# Patient Record
Sex: FEMALE | Race: WHITE
[De-identification: names, ages, dates, MRNs, and addresses within clinical notes are randomized per-mention and may not be internally consistent; named-entity substitution may affect disease eponyms.]

---

## 2017-05-17 ENCOUNTER — HOSPITAL ENCOUNTER (OUTPATIENT)
Dept: HOSPITAL 57 - BURCT | Age: 67
Discharge: HOME | End: 2017-05-17
Attending: FAMILY MEDICINE
Payer: MEDICARE

## 2017-05-17 DIAGNOSIS — K57.90: ICD-10-CM

## 2017-05-17 DIAGNOSIS — R10.9: Primary | ICD-10-CM

## 2017-05-17 DIAGNOSIS — N13.2: ICD-10-CM

## 2017-05-17 PROCEDURE — 74176 CT ABD & PELVIS W/O CONTRAST: CPT

## 2017-05-17 NOTE — CT
CT ABDOMEN AND PELVIS WITHOUT CONTRAST

5/17/17

 

Spiral CT of the abdomen and pelvis was performed for evaluation of flank pain. Axial slices were ac
quired. Then coronal and sagittal reconstructions were done. 

 

The lung bases are clear. The liver, spleen, pancreas, adrenal glands and abdominal aorta showed no 
acute findings. 

 

Small bilateral nonobstructing renal calculi are present. In addition, however, there is a 7 mm calc
ulus in the proximal left ureter that is causing mild left hydronephrosis. No further stones were se
en downstream. 

 

The bowel is nondistended. There is no sign of obstruction, free fluid or free air. No inflammatory 
changes are seen around bowel. 

 

CT of the pelvis showed no pelvic masses or inflammatory changes. No free fluid is present. Some min
imal diverticula are present. Degenerative changes are present in the spine. 

 

IMPRESSION:  

1.      7 mm calculus in the proximal left ureter causing mild left hydronephrosis.

2.      Bilateral nonobstructing renal calculi. 

3.      Minimal diverticulosis without findings of diverticulitis. 

 

POS: HOME

## 2017-12-20 ENCOUNTER — HOSPITAL ENCOUNTER (OUTPATIENT)
Dept: HOSPITAL 92 - TBSIIMAG | Age: 67
Discharge: HOME | End: 2017-12-20
Attending: ORTHOPAEDIC SURGERY
Payer: MEDICARE

## 2017-12-20 DIAGNOSIS — S83.281A: ICD-10-CM

## 2017-12-20 DIAGNOSIS — M25.561: Primary | ICD-10-CM

## 2017-12-20 DIAGNOSIS — M17.11: ICD-10-CM

## 2017-12-20 DIAGNOSIS — S83.241A: ICD-10-CM

## 2017-12-20 NOTE — MRI
MRI OF THE RIGHT KNEE WITHOUT CONTRAST:

 

INDICATION: 

Right knee pain.

 

COMPARISON: 

None.

 

FINDINGS: 

There is mild osteoarthrosis involving the right knee with predominantly  affecting the patellofemora
l and medial femoral tibial joint compartments.  There is a complex but predominantly horizontally or
iented tear involving the body and posterior horn of the medial meniscus.  There is a horizontally or
iented tear involving the body of the posterior horn of the lateral meniscus.  There is suspected rad
ial tear involving the posterior root of the lateral meniscus on image 14 of series 5.  The ACL, PCL,
 MCL, and LCLC are intact.  There is subchondral cyst-like abnormality seen within the proximal fibul
a as well as within the patella.  The MCL, LCLC, and extensor mechanism are intact.

 

IMPRESSION: 

1.  Mild osteoarthrosis of the right knee.

2.  Medial and lateral meniscal tears.

 

POS: [facility]

## 2018-01-22 ENCOUNTER — HOSPITAL ENCOUNTER (OUTPATIENT)
Dept: HOSPITAL 92 - LABBT | Age: 68
Discharge: HOME | End: 2018-01-22
Attending: ORTHOPAEDIC SURGERY
Payer: MEDICARE

## 2018-01-22 DIAGNOSIS — Z01.812: Primary | ICD-10-CM

## 2018-01-22 DIAGNOSIS — M17.12: ICD-10-CM

## 2018-01-22 DIAGNOSIS — S83.206A: ICD-10-CM

## 2018-01-22 DIAGNOSIS — Z01.810: ICD-10-CM

## 2018-01-22 LAB
ANION GAP SERPL CALC-SCNC: 15 MMOL/L (ref 10–20)
BASOPHILS # BLD AUTO: 0 THOU/UL (ref 0–0.2)
BASOPHILS NFR BLD AUTO: 0.5 % (ref 0–1)
BUN SERPL-MCNC: 11 MG/DL (ref 9.8–20.1)
CALCIUM SERPL-MCNC: 9.2 MG/DL (ref 7.8–10.44)
CHLORIDE SERPL-SCNC: 103 MMOL/L (ref 98–107)
CO2 SERPL-SCNC: 28 MMOL/L (ref 23–31)
CREAT CL PREDICTED SERPL C-G-VRATE: 0 ML/MIN (ref 70–130)
EOSINOPHIL # BLD AUTO: 0.3 THOU/UL (ref 0–0.7)
EOSINOPHIL NFR BLD AUTO: 3.3 % (ref 0–10)
GLUCOSE SERPL-MCNC: 153 MG/DL (ref 80–115)
HGB BLD-MCNC: 14.2 G/DL (ref 12–16)
LYMPHOCYTES # BLD: 2.5 THOU/UL (ref 1.2–3.4)
LYMPHOCYTES NFR BLD AUTO: 32.2 % (ref 21–51)
MCH RBC QN AUTO: 30.5 PG (ref 27–31)
MCV RBC AUTO: 94.2 FL (ref 81–99)
MONOCYTES # BLD AUTO: 0.6 THOU/UL (ref 0.11–0.59)
MONOCYTES NFR BLD AUTO: 7.9 % (ref 0–10)
NEUTROPHILS # BLD AUTO: 4.3 THOU/UL (ref 1.4–6.5)
NEUTROPHILS NFR BLD AUTO: 56.1 % (ref 42–75)
PLATELET # BLD AUTO: 235 THOU/UL (ref 130–400)
POTASSIUM SERPL-SCNC: 4.6 MMOL/L (ref 3.5–5.1)
RBC # BLD AUTO: 4.66 MILL/UL (ref 4.2–5.4)
SODIUM SERPL-SCNC: 141 MMOL/L (ref 136–145)
WBC # BLD AUTO: 7.7 THOU/UL (ref 4.8–10.8)

## 2018-01-22 PROCEDURE — 93010 ELECTROCARDIOGRAM REPORT: CPT

## 2018-01-22 PROCEDURE — 85025 COMPLETE CBC W/AUTO DIFF WBC: CPT

## 2018-01-22 PROCEDURE — 80048 BASIC METABOLIC PNL TOTAL CA: CPT

## 2018-01-22 PROCEDURE — 93005 ELECTROCARDIOGRAM TRACING: CPT

## 2018-01-23 NOTE — EKG
Test Reason : 

Blood Pressure : ***/*** mmHG

Vent. Rate : 058 BPM     Atrial Rate : 058 BPM

   P-R Int : 152 ms          QRS Dur : 082 ms

    QT Int : 418 ms       P-R-T Axes : 016 032 039 degrees

   QTc Int : 410 ms

 

Sinus bradycardia

Cannot rule out Anterior infarct , age undetermined

Abnormal ECG

When compared with ECG of 24-MAY-2017 13:43,

No significant change was found

Confirmed by LUCIANA MUNOZ ([id number]) on 1/23/2018 11:37:10 PM

 

Referred By:  DORCAS           Confirmed By:LUCIANA MUNOZ

## 2018-01-24 ENCOUNTER — HOSPITAL ENCOUNTER (OUTPATIENT)
Dept: HOSPITAL 92 - SDC | Age: 68
Discharge: HOME | End: 2018-01-24
Attending: ORTHOPAEDIC SURGERY
Payer: MEDICARE

## 2018-01-24 VITALS — BODY MASS INDEX: 42.7 KG/M2

## 2018-01-24 DIAGNOSIS — M81.0: ICD-10-CM

## 2018-01-24 DIAGNOSIS — Z79.82: ICD-10-CM

## 2018-01-24 DIAGNOSIS — Z90.711: ICD-10-CM

## 2018-01-24 DIAGNOSIS — Z79.899: ICD-10-CM

## 2018-01-24 DIAGNOSIS — F17.210: ICD-10-CM

## 2018-01-24 DIAGNOSIS — S83.281A: ICD-10-CM

## 2018-01-24 DIAGNOSIS — G47.30: ICD-10-CM

## 2018-01-24 DIAGNOSIS — S83.231A: Primary | ICD-10-CM

## 2018-01-24 DIAGNOSIS — I10: ICD-10-CM

## 2018-01-24 DIAGNOSIS — Z98.890: ICD-10-CM

## 2018-01-24 DIAGNOSIS — F41.9: ICD-10-CM

## 2018-01-24 DIAGNOSIS — Z79.51: ICD-10-CM

## 2018-01-24 DIAGNOSIS — Z90.49: ICD-10-CM

## 2018-01-24 DIAGNOSIS — Z88.8: ICD-10-CM

## 2018-01-24 DIAGNOSIS — G43.909: ICD-10-CM

## 2018-01-24 DIAGNOSIS — Z80.9: ICD-10-CM

## 2018-01-24 DIAGNOSIS — Z98.1: ICD-10-CM

## 2018-01-24 DIAGNOSIS — M22.41: ICD-10-CM

## 2018-01-24 DIAGNOSIS — Z87.442: ICD-10-CM

## 2018-01-24 PROCEDURE — 0SBC4ZZ EXCISION OF RIGHT KNEE JOINT, PERCUTANEOUS ENDOSCOPIC APPROACH: ICD-10-PCS | Performed by: ORTHOPAEDIC SURGERY

## 2018-01-24 PROCEDURE — 97139 UNLISTED THERAPEUTIC PX: CPT

## 2018-01-24 PROCEDURE — S0020 INJECTION, BUPIVICAINE HYDRO: HCPCS

## 2018-01-24 PROCEDURE — 29880 ARTHRS KNE SRG MNISECTMY M&L: CPT

## 2018-01-24 NOTE — OP
DATE OF PROCEDURE:  01/24/2018

 

PREOPERATIVE DIAGNOSIS:  Right knee medial meniscus tear.

 

POSTOPERATIVE DIAGNOSES:

1.  Right knee medial and lateral meniscal tears.

2.  Global grade 2 chondromalacia, medial femoral condyle.

3.  Grade 2 chondromalacia of patella.



PROCEDURE:

1  Right knee arthroscopy with partial medial and lateral menisectomies

 

SURGEON:  Daron Benton M.D.

 

ASSISTANT:  None.

 

BLOOD LOSS:  Minimal.

 

COMPLICATIONS:  None.

 

ANESTHESIA:  The patient had general anesthetic.  She also had local knee block.

 

DISPOSITION:  She went to the recovery room in stable condition.

 

INDICATIONS:  This is a 67-year-old active female, who comes in complaining of 
sharp pain and swelling in the knee.  An MRI scan confirmed that Ms. Lyons 
had a significant posterior horn medial meniscus tear with a flap component, 
and at this time, she opted to have surgery.

 

DESCRIPTION OF PROCEDURE:  After all appropriate consent forms were explained 
and signed, she was taken to the operating room and at this time was given 
general anesthetic.  Once the level of anesthesia was appropriate, she was 
taken back to the operating room and at this time was given general anesthetic.
  Tourniquet was placed on the right thigh, and leg was placed in an 
arthroscopic leg duenas.  The limb was then prepped and draped in standard 
surgical fashion.  Limb was exsanguinated, and tourniquet taken up to 300 mmHg.
  An inferolateral portal was established, and the scope was placed into the 
knee joint.  Needle localization technique was then used to make a medial 
working portal.  Diagnostic arthroscopy commenced in the notch.  The ACL and 
PCL were probed and found to be intact.  There was a small bone spur at the ACL 
insertion.  This was taken down with the shaver.  The medial femoral condyle 
and medial tibial plateau were evaluated.  There was just a global grade 2, no 
exposed bone, no significant cartilage flaps.  There was a large complex tear 
of the posterior horn and body of the medial meniscus, and a partial 
meniscectomy was performed using meniscal biter and shaver.  Lateral 
compartment was entered.  There were a couple areas of the lateral meniscus, 
which were torn including the body and posterior horn/root.  Again, partial 
meniscectomy was performed back to a stable base.  Overall, in that compartment
, the cartilage was in good condition.  The gutters were swept through, and no 
loose bodies were noted in the medial gutter.  The lateral gutter had some 
cartilaginous loose bodies, which were removed with a suction shaver.  We then 
evaluated the patellofemoral joint.  The patella had some grade 2 
chondromalacia with some unstable chondral flaps, which were taken down gently 
with the shaver.  Overall, the trochlea did have some early chondromalacia with 
no significant treatments needed.  At this time, the scope was removed, the 
knee was drained.  Portals were closed with simple nylon stitch.  A bulky 
sterile dressing was applied, and the tourniquet was let down.  Toes pinked up 
nicely.  The patient was awakened and taken to recovery room in stable 
condition.  All counts were correct at the end of the case.  She received 
preoperative IV antibiotics.

 

CELESTINE

## 2018-04-23 ENCOUNTER — HOSPITAL ENCOUNTER (OUTPATIENT)
Dept: HOSPITAL 92 - TBSIIMAG | Age: 68
Discharge: HOME | End: 2018-04-23
Attending: ANESTHESIOLOGY
Payer: MEDICARE

## 2018-04-23 DIAGNOSIS — M48.061: ICD-10-CM

## 2018-04-23 DIAGNOSIS — M99.83: ICD-10-CM

## 2018-04-23 DIAGNOSIS — M47.26: Primary | ICD-10-CM

## 2018-04-23 PROCEDURE — 72148 MRI LUMBAR SPINE W/O DYE: CPT

## 2018-04-23 NOTE — MRI
MRI LUMBAR SPINE WITHOUT CONTRAST:

 

Date:  04/23/18 

 

HISTORY:  

M54.16, lumbar radiculopathy. 

 

COMPARISON:  

Lumbar spine radiograph from 2011. 

 

FINDINGS:

The aortic contour is normal. No aneurysmal dilatation. 

 

Small cyst of ovarian remnants. Paraspinal muscular atrophy, mild to moderate, and symmetric. 

 

No hydronephrosis. 

 

Posterior spinal fusion L4-S1. No listhesis. Adequate osseous corporation of the disc spacers. 

 

Background marrow signal is normal. 

 

Levels are as follows:

 

T12-L1:

Mild facet arthrosis. No neural foraminal or spinal canal narrowing. 

 

L1-2: 

Low grade disc bulge. No neural foraminal or spinal canal narrowing. 

 

L2-3:

Mild disc desiccation and height loss. Mild circumferential disc bulge. Mild facet arthropathy. The s
da canal measures approximately 6.0 mm. Mild bilateral neural foraminal narrowing. 

 

L3-4: 

Moderate degenerative disc space height loss. There is circumferential disc bulge. Moderate facet art
hropathy. There is ligamentum flavum hypertrophy. The spinal canal is narrowed approximately 5.0 mm. 
There is some crowding of the nerve roots at this level. There is moderate left and moderate right-si
ded neural foraminal narrowing with abutment of the exiting right nerve root. 

 

L4-5: 

No significant neural foraminal or spinal canal narrowing. 

 

L5-S1;

No neural foraminal or spinal canal narrowing. 

 

IMPRESSION: 

Multilevel spondylosis as described above, worst at L3-4 and L2-3, with neural foraminal and spinal c
anal narrowing. 

 

 

POS: [facility]

## 2018-06-22 ENCOUNTER — HOSPITAL ENCOUNTER (INPATIENT)
Dept: HOSPITAL 92 - ERS | Age: 68
LOS: 1 days | Discharge: HOME | DRG: 694 | End: 2018-06-23
Attending: FAMILY MEDICINE | Admitting: FAMILY MEDICINE
Payer: MEDICARE

## 2018-06-22 VITALS — BODY MASS INDEX: 44 KG/M2

## 2018-06-22 DIAGNOSIS — Z98.1: ICD-10-CM

## 2018-06-22 DIAGNOSIS — M81.0: ICD-10-CM

## 2018-06-22 DIAGNOSIS — F17.210: ICD-10-CM

## 2018-06-22 DIAGNOSIS — M19.90: ICD-10-CM

## 2018-06-22 DIAGNOSIS — Z87.440: ICD-10-CM

## 2018-06-22 DIAGNOSIS — Z87.19: ICD-10-CM

## 2018-06-22 DIAGNOSIS — I10: ICD-10-CM

## 2018-06-22 DIAGNOSIS — Z98.890: ICD-10-CM

## 2018-06-22 DIAGNOSIS — R31.9: ICD-10-CM

## 2018-06-22 DIAGNOSIS — E66.01: ICD-10-CM

## 2018-06-22 DIAGNOSIS — F41.9: ICD-10-CM

## 2018-06-22 DIAGNOSIS — G47.33: ICD-10-CM

## 2018-06-22 DIAGNOSIS — N13.2: Primary | ICD-10-CM

## 2018-06-22 LAB
ALBUMIN SERPL BCG-MCNC: 4.1 G/DL (ref 3.4–4.8)
ALP SERPL-CCNC: 187 U/L (ref 40–150)
ALT SERPL W P-5'-P-CCNC: 21 U/L (ref 8–55)
ANION GAP SERPL CALC-SCNC: 15 MMOL/L (ref 10–20)
APTT PPP: 31.7 SEC (ref 22.9–36.1)
AST SERPL-CCNC: 18 U/L (ref 5–34)
BASOPHILS # BLD AUTO: 0 THOU/UL (ref 0–0.2)
BASOPHILS NFR BLD AUTO: 0.4 % (ref 0–1)
BILIRUB SERPL-MCNC: 0.3 MG/DL (ref 0.2–1.2)
BUN SERPL-MCNC: 14 MG/DL (ref 9.8–20.1)
CALCIUM SERPL-MCNC: 9.3 MG/DL (ref 7.8–10.44)
CHLORIDE SERPL-SCNC: 105 MMOL/L (ref 98–107)
CO2 SERPL-SCNC: 26 MMOL/L (ref 23–31)
CREAT CL PREDICTED SERPL C-G-VRATE: 0 ML/MIN (ref 70–130)
CRYSTAL-AUWI FLAG: 0.8 (ref 0–15)
CRYSTAL-AUWI FLAG: 1.1 (ref 0–15)
EOSINOPHIL # BLD AUTO: 0.2 THOU/UL (ref 0–0.7)
EOSINOPHIL NFR BLD AUTO: 2 % (ref 0–10)
GLOBULIN SER CALC-MCNC: 2.7 G/DL (ref 2.4–3.5)
GLUCOSE SERPL-MCNC: 164 MG/DL (ref 80–115)
HEV IGM SER QL: 0.3 (ref 0–7.99)
HEV IGM SER QL: 5.3 (ref 0–7.99)
HGB BLD-MCNC: 14.1 G/DL (ref 12–16)
HYALINE CASTS #/AREA URNS LPF: (no result) LPF
INR PPP: 1
LYMPHOCYTES # BLD: 2.1 THOU/UL (ref 1.2–3.4)
LYMPHOCYTES NFR BLD AUTO: 22.5 % (ref 21–51)
MCH RBC QN AUTO: 30.5 PG (ref 27–31)
MCV RBC AUTO: 91.2 FL (ref 78–98)
MONOCYTES # BLD AUTO: 0.5 THOU/UL (ref 0.11–0.59)
MONOCYTES NFR BLD AUTO: 5.8 % (ref 0–10)
NEUTROPHILS # BLD AUTO: 6.5 THOU/UL (ref 1.4–6.5)
NEUTROPHILS NFR BLD AUTO: 69.4 % (ref 42–75)
PATHC CAST-AUWI FLAG: 1.71 (ref 0–2.49)
PATHC CAST-AUWI FLAG: 9.01 (ref 0–2.49)
PLATELET # BLD AUTO: 258 THOU/UL (ref 130–400)
POTASSIUM SERPL-SCNC: 4.1 MMOL/L (ref 3.5–5.1)
PROT UR STRIP.AUTO-MCNC: 100 MG/DL
PROT UR STRIP.AUTO-MCNC: 30 MG/DL
PROTHROMBIN TIME: 13.3 SEC (ref 12–14.7)
RBC # BLD AUTO: 4.62 MILL/UL (ref 4.2–5.4)
RBC UR QL AUTO: (no result) HPF (ref 0–3)
SODIUM SERPL-SCNC: 142 MMOL/L (ref 136–145)
SP GR UR STRIP: 1.03 (ref 1–1.04)
SP GR UR STRIP: 1.03 (ref 1–1.04)
SPERM-AUWI FLAG: 0 (ref 0–9.9)
SPERM-AUWI FLAG: 0 (ref 0–9.9)
WBC # BLD AUTO: 9.3 THOU/UL (ref 4.8–10.8)
WBC UR QL AUTO: (no result) HPF (ref 0–3)
WBC UR QL AUTO: (no result) HPF (ref 0–3)
YEAST-AUWI FLAG: 0 (ref 0–25)
YEAST-AUWI FLAG: 176.6 (ref 0–25)

## 2018-06-22 PROCEDURE — 71045 X-RAY EXAM CHEST 1 VIEW: CPT

## 2018-06-22 PROCEDURE — 90670 PCV13 VACCINE IM: CPT

## 2018-06-22 PROCEDURE — A4353 INTERMITTENT URINARY CATH: HCPCS

## 2018-06-22 PROCEDURE — G0009 ADMIN PNEUMOCOCCAL VACCINE: HCPCS

## 2018-06-22 PROCEDURE — 74420 UROGRAPHY RTRGR +-KUB: CPT

## 2018-06-22 PROCEDURE — 96365 THER/PROPH/DIAG IV INF INIT: CPT

## 2018-06-22 PROCEDURE — 87086 URINE CULTURE/COLONY COUNT: CPT

## 2018-06-22 PROCEDURE — 85610 PROTHROMBIN TIME: CPT

## 2018-06-22 PROCEDURE — 81015 MICROSCOPIC EXAM OF URINE: CPT

## 2018-06-22 PROCEDURE — 96375 TX/PRO/DX INJ NEW DRUG ADDON: CPT

## 2018-06-22 PROCEDURE — 81003 URINALYSIS AUTO W/O SCOPE: CPT

## 2018-06-22 PROCEDURE — C1758 CATHETER, URETERAL: HCPCS

## 2018-06-22 PROCEDURE — 90471 IMMUNIZATION ADMIN: CPT

## 2018-06-22 PROCEDURE — C1769 GUIDE WIRE: HCPCS

## 2018-06-22 PROCEDURE — 80048 BASIC METABOLIC PNL TOTAL CA: CPT

## 2018-06-22 PROCEDURE — 85730 THROMBOPLASTIN TIME PARTIAL: CPT

## 2018-06-22 PROCEDURE — 85025 COMPLETE CBC W/AUTO DIFF WBC: CPT

## 2018-06-22 PROCEDURE — 36415 COLL VENOUS BLD VENIPUNCTURE: CPT

## 2018-06-22 PROCEDURE — 96361 HYDRATE IV INFUSION ADD-ON: CPT

## 2018-06-22 PROCEDURE — 80053 COMPREHEN METABOLIC PANEL: CPT

## 2018-06-22 PROCEDURE — 74176 CT ABD & PELVIS W/O CONTRAST: CPT

## 2018-06-22 PROCEDURE — 93005 ELECTROCARDIOGRAM TRACING: CPT

## 2018-06-22 PROCEDURE — 96376 TX/PRO/DX INJ SAME DRUG ADON: CPT

## 2018-06-22 NOTE — CT
CT ABDOMEN NONCONTRAST

CT PELVIS NONCONTRAST:

(urolithiasis protocol)

 

DATE:

6/22/18

 

TIME:

5:45 p.m.

 

HISTORY:

68-year-old female with history of calculus of kidney presents with acute right flank pain. 

 

COMPARISON:

5/17/17

 

TECHNIQUE:

IV injection of iodinated contrast media: none

Oral contrast media: none

 

FINDINGS:

Other than for urolithiasis, the lack of IV and oral contrast limits the evaluation.

 

Previously, there was a calculus lodged in the left mid ureter causing left hydronephrosis. That calc
ulus is no longer present in the left ureter, and there is currently no left sided hydronephrosis. Ho
wever, one of the right renal lower pole renal calculi on the previous study has now migrated to the 
right proximal ureter at the upper L4 level. It measures 6 x 6 x 3 mm. It causes a new finding of mod
erate dilation of the extrarenal pelvis, mild to moderate dilation of right calyces, a small amount o
f right perirenal fluid, and right nephromegaly. The perirenal fluid may represent extravasated urine
 due to pyelocalyceal blowout. There are at least two other calculi in the right kidney, approximatel
y 2 mm in size each. There are a few calculi in the left renal upper and lower poles, on the order of
 2 mm in size each. The urinary bladder is empty.

 

Within limitations of a noncontrast scan, no major obvious pathology is identified involving the abdo
brennan aorta, adrenals, or spleen.  There is mild hepatic steatosis and mild atrophy of the pancreas. 
Numerous diverticula through the sigmoid colon and descending colon, without signs of acute diverticu
litis. No ascites or pneumoperitoneum. No small bowel dilation. Bilateral pedicle screws and vertical
 interlocking rods and interbody cages, at lower lumbar spine. 

 

IMPRESSION:

1.      A 6 mm calculus lodged at the right proximal ureter causing obstructive uropathy, with mild t
o moderate right hydronephrosis and right perirenal fluid. 

2.      Bilateral nephrolithiasis.

3.      Hepatic steatosis.

4.      Colonic diverticulosis without diverticulitis. 

 

 

OLIVER CHEW

 

POS: [facility]

## 2018-06-22 NOTE — RAD
RADIOGRAPH CHEST 1 VIEW:

6/22/18

 

HISTORY: 

68-year-old female for preoperative evaluation.

 

FINDINGS:

There are no air space densities, pulmonary edema, pneumothorax, or cardiomegaly.  The lateral costop
hrenic angles are sharp.

 

IMPRESSION:  

No acute cardiopulmonary findings.

 

 

marky []

 

POS: [facility]

## 2018-06-23 VITALS — SYSTOLIC BLOOD PRESSURE: 143 MMHG | TEMPERATURE: 97.5 F | DIASTOLIC BLOOD PRESSURE: 81 MMHG

## 2018-06-23 LAB
ANION GAP SERPL CALC-SCNC: 13 MMOL/L (ref 10–20)
BASOPHILS # BLD AUTO: 0.1 THOU/UL (ref 0–0.2)
BASOPHILS NFR BLD AUTO: 0.6 % (ref 0–1)
BUN SERPL-MCNC: 18 MG/DL (ref 9.8–20.1)
CALCIUM SERPL-MCNC: 9 MG/DL (ref 7.8–10.44)
CHLORIDE SERPL-SCNC: 105 MMOL/L (ref 98–107)
CO2 SERPL-SCNC: 26 MMOL/L (ref 23–31)
CREAT CL PREDICTED SERPL C-G-VRATE: 108 ML/MIN (ref 70–130)
EOSINOPHIL # BLD AUTO: 0.2 THOU/UL (ref 0–0.7)
EOSINOPHIL NFR BLD AUTO: 2.1 % (ref 0–10)
GLUCOSE SERPL-MCNC: 133 MG/DL (ref 80–115)
HGB BLD-MCNC: 12.3 G/DL (ref 12–16)
LYMPHOCYTES # BLD: 2.6 THOU/UL (ref 1.2–3.4)
LYMPHOCYTES NFR BLD AUTO: 30.1 % (ref 21–51)
MANUAL MICROSCOPIC REVIEWED?: (no result)
MCH RBC QN AUTO: 30.4 PG (ref 27–31)
MCV RBC AUTO: 91.6 FL (ref 78–98)
MONOCYTES # BLD AUTO: 0.7 THOU/UL (ref 0.11–0.59)
MONOCYTES NFR BLD AUTO: 8 % (ref 0–10)
NEUTROPHILS # BLD AUTO: 5.1 THOU/UL (ref 1.4–6.5)
NEUTROPHILS NFR BLD AUTO: 59.2 % (ref 42–75)
PLATELET # BLD AUTO: 209 THOU/UL (ref 130–400)
POTASSIUM SERPL-SCNC: 4.2 MMOL/L (ref 3.5–5.1)
RBC # BLD AUTO: 4.03 MILL/UL (ref 4.2–5.4)
RBC UR QL AUTO: (no result) HPF (ref 0–3)
SODIUM SERPL-SCNC: 140 MMOL/L (ref 136–145)
WBC # BLD AUTO: 8.6 THOU/UL (ref 4.8–10.8)

## 2018-06-23 PROCEDURE — BT1D1ZZ FLUOROSCOPY OF RIGHT KIDNEY, URETER AND BLADDER USING LOW OSMOLAR CONTRAST: ICD-10-PCS | Performed by: UROLOGY

## 2018-06-23 PROCEDURE — 0T768DZ DILATION OF RIGHT URETER WITH INTRALUMINAL DEVICE, VIA NATURAL OR ARTIFICIAL OPENING ENDOSCOPIC: ICD-10-PCS | Performed by: UROLOGY

## 2018-06-23 NOTE — RAD
RETROGRADE IVP:

 

COMPARISON: 

CT abdomen/pelvis 6/22/18.

 

FINDINGS/IMPRESSION: 

Multiple limited intraoperative fluoroscopic views from a retrograde IVP were submitted for interpret
ation.  Hardware is seen in the lumbar spine.  No obvious calcifications are seen projecting over eit
her renal shadow. The calcifications seen in the proximal ureter on CT is difficult to visualize on t
he fluoroscopic images.  A double-J ureteral stent is eventually placed in the right renal collecting
 system.

 

POS: [facility]

## 2018-06-23 NOTE — HP
PRIMARY CARE PHYSICIAN:  Dr. Hollis

 

CODE STATUS:  FULL CODE.

 

TIME OF EVALUATION:  2100 hours.

 

CHIEF COMPLAINT:  Right flank pain.

 

HISTORY OF PRESENT ILLNESS:  This is a 68-year-old female patient with past 
medical history of multiple kidney stones in the past, also has a history of 
hypertension.  The patient came to the hospital after having severe right flank 
pain that has been on since 2:00 p.m. from the very beginning, the pain was 10/
10, associated with sweating, nausea, no clear triggers, no alleviating 
factors.  CT scan was done.  The patient shows a 6-mm calculus lodged at the 
right proximal ureter causing obstructive uropathy with mild-to-moderate right 
hydronephrosis and right perirenal fluid.  For that reason, Urology was being 
consulted and is planning for the patient to have cystoscopy, stent placement 
tomorrow morning.

 

REVIEW OF SYSTEMS:  Constitutional:  No fever, no chills, generalized weakness.
  Respiratory:  No cough, no sputum production, no shortness of breath.  
Cardiovascular:  No chest pain, palpitation, shortness of breath.  
Gastrointestinal:  No abdominal pain, no nausea, no vomiting, no diarrhea.  
Central Nervous System:  No dizziness, headache, or feeling lightheaded.  
Genitourinary:  No burning on urination.  Extremities:  No leg swelling.  All 
other systems reviewed were negative except for the findings mentioned above.

 

PAST MEDICAL HISTORY:  Positive for hypertension and kidney stones.

 

PAST SURGICAL HISTORY:  Back surgery x4, cholecystectomy, hysterectomy.

 

PSYCHIATRIC HISTORY:  Includes depression.

 

SOCIAL HISTORY:  The patient uses tobacco.  Denies drug use.  Denies alcohol 
use.

 

ALLERGIES:  Latex allergies and metformin.

 

REPORTED MEDICATIONS:  Carvedilol 6.25 mg orally b.i.d., ramipril 5 mg orally 
daily, atorvastatin 40 mg orally daily, duloxetine 60 mg orally daily, aspirin 
81 mg orally daily, Vicodin 300 mg every 8 hours daily, gabapentin 300 mg 
orally.

 

PHYSICAL EXAMINATION:

VITAL SIGNS:  On presentation, blood pressure 182/82 systolic with a pulse 71, 
respiratory rate 18, temperature 97.9, pain 10/10 on presentation, saturation 
93 on room air.

GENERAL APPEARANCE:  She is alert, oriented, not in distress and pain 
medication hasalleviated the pain.

HEENT:  Eyes:  Normal conjunctivae.  Moist oral mucosa.  Anicteric.

NECK:  No JVD.

RESPIRATORY:  Bilateral air entry.  No rales, no wheezing.  Symmetrical 
expansion.

CARDIOVASCULAR:  Normal rate, regular rhythm.  No murmurs, no gallop, no edema.

ABDOMEN:  Soft.  Patient has tenderness in the right side alleviated by pain 
medication.

MUSCULOSKELETAL:  Baseline range of motion and strength.  No tenderness.

SKIN:  Warm and intact.  No pallor, no rash, no redness.

NEUROLOGIC:  Baseline sensorium.  No evidence of any new focal weakness.  
Baseline speech.  Cranial nerves seemed to be intact.

PSYCHIATRIC:  Good mood.  No anxiety.  Oriented, optimal judgment.

 

LABORATORY DATA:  Reviewed.  The patient had white count 9.3, hemoglobin 14.1, 
MCV 91, platelet count 158,000.  PT 13, INR 1, PTT 31.  Chemistry:  Sodium 142, 
potassium 4.1, carbon dioxide 26, anion gap 15, BUN 14, creatinine 0.8, GFR 63, 
glucose 164, calcium 9.3.  LFTs are normal.  The urine was reviewed, the 
patient has white count that was normal 2 to 3.  The patient has hematuria with 
rbc's greater than 50, too numerous to count.

 

IMAGING:

CAT scan;

1.  A 6-mm calculus lodged in the right proximal ureter causing obstructive 
uropathy with mild-to-moderate right hydronephrosis and right perirenal fluid.

2.  Bilateral nephrolithiasis.

3.  Hepatic steatosis.

4.  Colonic diverticulosis without diverticulitis. 

 

Chest x-ray reported no acute cardiopulmonary problems.

 

ASSESSMENT AND PLAN:  The patient was admitted with the following medical 
condition.

1.  Right nephrolithiasis with urinary obstruction, as seen on the CT of the 
abdomen, urology has seen the patient, planned to place a stent in the morning.
  _ Continue all previous medication, pain meds,continue IV fluids.

2.  Hematuria.  Likely related to kidney stone.  We will treat the underlying 
condition.

3.  Uncontrolled blood pressure with systolic 187, likely secondary to acute 
physical distress, we will treat the underlying condition, we will reconcile 
home medications and treat p.r.n. for optimal control.

4.  Deep venous thrombosis prophylaxis.

 

DANKD

## 2018-06-23 NOTE — OP
DATE OF SERVICE:  06/23/2018

 

PREOPERATIVE DIAGNOSES:

1.  A 68-year-old female with history of recurrent kidney stone, right 
hydronephrosis secondary to proximal ureteral calculi.

2.  Intractable pain.

 

POSTOPERATIVE DIAGNOSES:

1.  A 68-year-old female with history of recurrent kidney stone, right 
hydronephrosis secondary to proximal ureteral calculi.

2.  Intractable pain.

 

PROCEDURE:  Cystoscopy, right retrograde 6 x 24 stent placement.

 

SURGEON:  Olive Borges D.O.

 

ANESTHESIA:  General.

 

COMPLICATIONS:  None apparent.

 

DISPOSITION:  Recovery room in stable condition.

 

INDICATIONS FOR THE PROCEDURE AND HISTORY:  Ms. Lyons is a 68-year-old female 
with history of recurrent kidney stone followed by Dr. Friedman.  The patient 
presented to the emergency room late yesterday evening due to intractable right 
flank pain.  She presents for cystoscopy, right retrograde stent placement.  
She has been fully informed regarding indications for ureteral stent, that 
elective ureteroscopy, laser lithotripsy will be scheduled at a later date at 
the discretion of Dr. Friedman.  Urine culture is pending.  Risks, complications 
and indications reviewed with her in detail.  The patient has been fully 
informed regarding expectation of discomfort that can be present with 
indwelling ureteral stent.  She desires to proceed.

 

DESCRIPTION OF THE PROCEDURE:  After an informed consent is signed, the patient 
is taken to the operating room, placed in a dorsal lithotomy position with the 
genital area prepped and draped in usual surgical sterile fashion.  A 21-Yakut 
cystoscope was utilized for cystoscopy.  Upon entering the bladder, mild debris 
was noted.  The UO's are located in normal anatomical location.  There was no 
evidence of bladder tumor.  No bladder stones.  The right UO was intubated with 
a 5 Yakut open-ended catheter and using 1:1 diluted contrast, retrograde 
pyelogram performed.  There was a subtle filling defect in the right proximal 
ureter consistent with stone and hydronephrosis.  There was no extravasation of 
contrast.  A 0.35 sensor wire was then placed into the right upper pole.  A 6 x 
24 double-J ureteral stent passed without difficulty.  The wire was then 
completely removed and bladder emptied.  She will be transferred back to the 
floor.  Patient can be discharged home when tolerated with oral pain 
medication.  The patient is instructed to call our office on Monday to schedule 
a followup appointment with Dr. Friedman.  Prescription is in chart for Norco 10/
325, ciprofloxacin for 3 days until culture can be reviewed, AZO over the 
counter p.r.n., VESIcare today for bladder spasm.

 

MTDD

## 2018-06-23 NOTE — CON
DATE OF CONSULTATION:  06/22/2018

 

REASON FOR CONSULTATION:  Intractable pain due to right proximal ureteral 
calculi.

 

PRIMARY UROLOGIST:  Bean Friedman M.D.

 

HISTORY OF PRESENT ILLNESS:  Ms. Lyons is a 68-year-old morbidly obese female 
with history of recurrent kidney stone, followed by Dr. Friedman.  The patient 
called our office late this afternoon, she is on chronic narcotics due to back 
pain on Percocet.  She states that her pain and her right lower back started at 
2:00 while shopping due to intractable discomfort which she rated 30/10 on the 
pain scale.  She presented to the emergency room.  She has been provided 
appropriate pain medication and currently rates her pain 0/10.  She denies 
history of nausea, vomiting, fever or gross hematuria.  CT of the abdomen and 
pelvis obtained demonstrates right hydronephrosis, with perinephritic 
stranding.  Per ER staff, the patient has had no fever, no leukocytosis.

 

PAST MEDICAL HISTORY:  Includes hypertension, migraines, hemangioma, arthritis, 
anxiety, anemia, diverticulosis, obstructive sleep apnea, hypercholesterolemia, 
recurrent kidney stone, osteoporosis.

 

PAST SURGICAL HISTORY:  Lumbar fusion, hysterectomy, tonsillectomy, sinus 
surgery, laparoscopic cholecystectomy, status left ureteroscopy, laser 
lithotripsy, 6 x 26 double-J ureteral stent placement 05/2017 by Dr. Friedman, 
right knee meniscectomy 01/2018.

 

FAMILY HISTORY:  Positive for heart disease, asthma.

 

SOCIAL HISTORY:  She is a smoker, half pack a day for 30 years.  Denies 
excessive caffeine intake.  She is retired, has 2 children, .

 

ALLERGIES:  No known drug allergies

 

PHYSICAL EXAMINATION:  Per ER staff,

VITAL SIGNS:  Stable.

GENERAL:  The patient appears to be resting comfortably, denies pain at this 
time.

HEENT:  Grossly unremarkable.

HEART:  Regular rate.

LUNGS:  Clear.

ABDOMEN:  Morbidly obese, protuberant.  There is a midline laparotomy incision.
  There is no gross CVA tenderness on physical exam.

GENITOURINARY:  Deferred at this time.

EXTREMITIES:  No cyanosis, clubbing or edema.

 

PERTINENT LABS AND IMAGING:  Prior CT 05/2017 demonstrated 7 mm proximal left 
ureteral calculi with bilateral nonobstructing renal calculus, prior stone 
analysis demonstrating calcium moiety 95% monohydrate.

 

Current presenting white count is 9, hemoglobin 14, platelet 258, creatinine 
0.89.  Urinalysis demonstrates 4-6 rbc's, greater than 50 wbc's, 100 protein, 7-
10 epithelial, no bacteria, negative nitrites.

 

CT of the abdomen and pelvis stone protocol dated 06/22/2018 which I reviewed 
myself demonstrates no evidence of left hydronephrosis.  There are multiple 
left punctate renal calculi, approximately 3-4 in number, largest in the left 
upper pole approximately 3 mm.  Right kidney does demonstrate hydronephrosis, 
moderate in nature secondary to 6 x 6 x 3 mm stone at the level of 4.  Per 
official report, there is a mild amount of perirenal fluid.  This is minimal.  
There is a nephromegaly present.  Hepatic steatosis.

 

IMPRESSION/PLAN:  Ms. Lyons is a 68-year-old morbidly obese female with 
history of recurrent kidney stone, presents with right intractable flank pain 
due to proximal ureteral calculi.  Currently, her pain is controlled with IV 
pain medication.  The patient likely also has narcotic tolerance that she is on 
chronic Percocet.  The patient currently is stable.  She is scheduled for cysto 
right retrograde stent placement tomorrow morning.   n.p.o. after midnight.  
Informed patient that if she is feeling better with ureteral stent tomorrow, 
will be discharged and will follow up with Dr. Friedman for elective ureteroscopy
, laser lithotripsy.  As her initial UA is contaminated, we will order straight 
cath, UA, C and S.  N.p.o. after midnight.

 

[facility] InsaneRAVINDER

## 2018-06-24 NOTE — DIS
DATE OF ADMISSION:  06/22/2018

 

DATE OF DISCHARGE:  06/23/2018

 

CONDITION AT THE TIME OF DISCHARGE:  Stable and improved.

 

DISCHARGE DIAGNOSES:

1.  Right-sided hydronephrosis and nephrolithiasis, status post cystoscopy and right ureteral stent p
lacement.

2.  Renal colic.

3.  Hypertension.

4.  History of recurrent renal stones.

 

DISCHARGE MEDICATIONS:  Resume home medications as per the H&P dictated by my colleague, Dr. Garcia
.  New medications as follows:  Ciprofloxacin 500 mg p.o. q.12 hours for 3 days, VESIcare 5 mg daily,
 Norco as needed, and phenazopyridine 97.5 mg t.i.d. p.r.n.

 

PRIMARY CARE PHYSICIAN:  Emre Ledesma M.D.

 

INHOUSE CONSULTATIONS:  Neurology, Dr. Olive Borges.

 

PROCEDURES DONE IN THE HOSPITAL:  Include retrograde pyelogram and cystoscopy with right ureteral rhea
nt placement.  CT scan of the abdomen and pelvis upon presentation, which shows a 6 mm calculus lodge
d at the right proximal ureter causing obstructive uropathy with mild to moderate right-sided hydrone
phrosis and bilateral nephrolithiasis and hepatic steatosis and colonic diverticulosis without divert
iculitis.

 

HISTORY OF PRESENTING ILLNESS:  Ms. Lyons is a very pleasant 68-year-old female with known history 
of multiple recurrent stones, who presented to the emergency room with complaints of severe right-arslan
ed renal colic and flank pain.  Upon presentation, her urinalysis was unrevealing.  She did have hema
turia with rbc's greater than 50 and CT scan was done in the emergency room which showed 6 mm calculu
s lodged in the right side with right-sided hydronephrosis.  Urology was consulted and Internal Medic
ine team was consulted for admission.  Please see admission history and physical for further details.


 

The patient was seen by Urology, Dr. Olive Borges, earlier today morning and was taken to the OR
.  She underwent a successful right-sided ureteric stent placement.  She underwent cystoscopy and ret
rograde pyelogram as well.  There was a subtle filling defect in the right proximal ureter consistent
 with stone and hydronephrosis.  Stent was placed successfully and I saw the patient once she was bac
k in the room.

 

The patient is doing well and is eager to go home.  She denies any nausea, vomiting, diarrhea, pain o
r dysuria, frequency or hematuria.  She is hemodynamically stable and will be discharged home.  She h
as been cleared by Urology for discharge as well.  Her urine has been sent for culture and the result
s are pending at this time.  She was empirically treated with antibiotics and was given prescription 
for the same by Urology.  She was also started on AZO as needed and VESIcare by Dr. Borges.

 

She was seen and examined prior to discharge.

 

PHYSICAL EXAMINATION:

VITAL SIGNS:  This morning, temperature 97.5, pulse of 61, respirations 18, saturating 94% on room ai
r, blood pressure 143/81.

GENERAL:  No acute distress, awake, alert, and oriented x3.

CHEST:  Clear to auscultation bilaterally without any wheezing, rales or rhonchi.  Rate and rhythm is
 regular without any murmur, rubs or gallops.

 

She is discharged under the care of her family members, who were present in the room.  Discharge plan
 was discussed with the patient, who verbalized understanding.

 

LABORATORY DATA:  CBC unremarkable.  Serum chemistry shows blood sugar of 133, otherwise unremarkable
.  Urine culture pending at the time of discharge, no growth until date.

## 2018-06-28 ENCOUNTER — HOSPITAL ENCOUNTER (OUTPATIENT)
Dept: HOSPITAL 92 - SDC | Age: 68
Discharge: HOME | End: 2018-06-28
Attending: UROLOGY
Payer: MEDICARE

## 2018-06-28 VITALS — BODY MASS INDEX: 42.7 KG/M2

## 2018-06-28 DIAGNOSIS — Z87.442: ICD-10-CM

## 2018-06-28 DIAGNOSIS — F17.210: ICD-10-CM

## 2018-06-28 DIAGNOSIS — M81.0: ICD-10-CM

## 2018-06-28 DIAGNOSIS — D64.9: ICD-10-CM

## 2018-06-28 DIAGNOSIS — N20.2: Primary | ICD-10-CM

## 2018-06-28 DIAGNOSIS — Z79.82: ICD-10-CM

## 2018-06-28 DIAGNOSIS — G43.909: ICD-10-CM

## 2018-06-28 DIAGNOSIS — Z79.899: ICD-10-CM

## 2018-06-28 DIAGNOSIS — F41.9: ICD-10-CM

## 2018-06-28 DIAGNOSIS — I10: ICD-10-CM

## 2018-06-28 DIAGNOSIS — M19.90: ICD-10-CM

## 2018-06-28 PROCEDURE — 96374 THER/PROPH/DIAG INJ IV PUSH: CPT

## 2018-06-28 PROCEDURE — 0T768DZ DILATION OF RIGHT URETER WITH INTRALUMINAL DEVICE, VIA NATURAL OR ARTIFICIAL OPENING ENDOSCOPIC: ICD-10-PCS | Performed by: UROLOGY

## 2018-06-28 PROCEDURE — 0TP98DZ REMOVAL OF INTRALUMINAL DEVICE FROM URETER, VIA NATURAL OR ARTIFICIAL OPENING ENDOSCOPIC: ICD-10-PCS | Performed by: UROLOGY

## 2018-06-28 PROCEDURE — 76000 FLUOROSCOPY <1 HR PHYS/QHP: CPT

## 2018-06-28 PROCEDURE — 0TC08ZZ EXTIRPATION OF MATTER FROM RIGHT KIDNEY, VIA NATURAL OR ARTIFICIAL OPENING ENDOSCOPIC: ICD-10-PCS | Performed by: UROLOGY

## 2018-06-28 PROCEDURE — 52356 CYSTO/URETERO W/LITHOTRIPSY: CPT

## 2018-06-28 PROCEDURE — 82365 CALCULUS SPECTROSCOPY: CPT

## 2018-06-28 PROCEDURE — 0TC68ZZ EXTIRPATION OF MATTER FROM RIGHT URETER, VIA NATURAL OR ARTIFICIAL OPENING ENDOSCOPIC: ICD-10-PCS | Performed by: UROLOGY

## 2018-06-28 PROCEDURE — 88300 SURGICAL PATH GROSS: CPT

## 2018-06-28 PROCEDURE — C1769 GUIDE WIRE: HCPCS

## 2018-06-28 NOTE — OP
DATE OF PROCEDURE:  06/28/2018

 

SERVICE:  Urology.

 

SURGEON:  Bean Friedman M.D.

 

PREOPERATIVE DIAGNOSIS:  Right ureteral and renal stones.

 

POSTOPERATIVE DIAGNOSIS:  Right ureteral and renal stones.

 

PROCEDURE PERFORMED:  Right ureteroscopy, laser lithotripsy, basket extraction of stone, and replacem
ent of a 6 x 24 double-J stent.

 

INDICATIONS FOR PROCEDURE:  Ms. Lyons is a 68-year-old white female who presented to the ER over th
e weekend with severe right flank pain.  CT demonstrated a 6 mm proximal right renal stone with some 
additional small nonobstructing right renal calculi.  She had been stented emergently due to uncontro
lled pain and is now presenting for definitive stone management.  Risks and benefits have been discus
sed and she has agreed to proceed forward.

 

DESCRIPTION OF PROCEDURE:  After identification of armband and verification of consent, the patient w
as brought back to the operating room, given a general anesthesia with endotracheal intubation.  She 
was then placed in dorsal lithotomy position and prepped and draped in sterile fashion.  After approp
riate timeout, a lubricated 22 Belarusian rigid cystoscope was introduced per urethra into the bladder.  
Attention was turned to the right ureteral orifice from which there was a stent emanating.  Flexible 
graspers were used to grasp the stent and bring it out to the level of the urethral meatus.  A 0.035 
sensor wire was then advanced through the ureteral stent up to the level of the renal pelvis.  The st
ent was then removed and discarded leaving the wire in place.  A dual-lumen catheter was advanced ove
r the sensor wire up to the level of the midureter.  An Amplatz Super Stiff wire was then passed thro
ugh the second lumen up to the level of the renal pelvis.  The dual-lumen was then removed.  The sens
or wire was secured to the drapes as a safety wire and 13 x 15 x 28 cm ureteral access sheath was adv
anced over the Super Stiff wire up to the level of the proximal ureter.  Inner cannula and the Super 
Stiff wire were then removed leaving the outer sheath and the sensor wire in place as a safety wire. 
 A flexible digital ureteroscope was then passed through the ureteral access sheath up to the level o
f the proximal ureter where the stone was encountered.  The stone was knocked back into the renal pel
vis and at that point, a 365-micron laser fiber was used to fragment the stone into smaller pieces.  
The laser fiber was then removed and a 1.9 Belarusian 0 tip nitinol basket was used to grasp the fragment
s of fractured stone and removed them.  There were three additional stones found within the kidney.  
All nonobstructing and attached the renal papillae.  These were basketed and removed in their entiret
y and all submitted for stone analysis.  Upon completion, there were no right-sided renal calculi gayla
ntified.  Pullback ureteroscopy was employed and there were no additional stone seen within the urete
r.  Satisfied that all stones were removed and there was nothing within the ureter, the ureteroscope 
and ureteral access sheath were then removed.  The 22-Belarusian rigid cystoscope was then advanced back 
into the bladder over the sensor wire.  A 6 x 24 double-J stent with a string attached was advanced o
minnie the sensor wire up to the level of the renal pelvis.  The wire was then removed leaving a curl in
 the pelvis and curl in the bladder.  The cystoscope was then removed and the string attached to the 
patient's inner thigh with an Op-Site.  The bladder was emptied before cystoscope removal.  The patie
nt was taken out of lithotomy, awakened and taken to PACU for recovery in stable condition.

 

COMPLICATIONS:  None.

 

ESTIMATED BLOOD LOSS:  Minimal.

 

RETAINED TUBES AND DRAINS:  A 6 x 24 double-J stent on the right.

 

SPECIMENS:  Stone for stone analysis.

 

DISPOSITION:  The patient will be discharged home and follow up with me in approximately 2-3 weeks fo
r a postop check.

## 2018-07-17 ENCOUNTER — HOSPITAL ENCOUNTER (OUTPATIENT)
Dept: HOSPITAL 57 - BURRAD | Age: 68
Discharge: HOME | End: 2018-07-17
Attending: FAMILY MEDICINE
Payer: MEDICARE

## 2018-07-17 DIAGNOSIS — M25.511: Primary | ICD-10-CM

## 2018-07-17 DIAGNOSIS — M75.91: ICD-10-CM

## 2018-07-17 NOTE — RAD
RIGHT SHOULDER THREE VIEWS:

7/17/18

 

No fracture was seen. There is bony spurring at the AC joint, some of which is directed inferiorly an
d could potentially cause impingement on nearby tendons. The glenohumeral joint was unremarkable. No 
fracture was seen. 

 

IMPRESSION: 

AC joint spurring. 

 

POS: HOME

## 2019-11-01 NOTE — CT
CT LUMBAR MYELOGRAM:



INDICATIONS:

69-year-old female with low back pain and lumbar radiculopathy



COMPARISON:

Lumbar MRI dated April 23, 2018 and lumbar spinal radiograph dated September 19, 2018



TECHNIQUE:

Multiple CT images were obtained of the lumbar spine following the intrathecal administration of an O
mnipaque 300 Msolution.  Please see the lumbar myelogram for details concerning the injection

technique.  Axial, coronal, and sagittal reformatted images were constructed from the raw data.



FINDINGS:

Visualized retroperitoneal and paravertebral soft tissues: There is bilateral nephrolithiasis. There 
are mild vascular calcifications involving abdominal aorta. There are scattered diverticula

involving the visualized colon.



Spinal alignment: Spinal alignment is within normal limits.



Spinal instrumentation or postsurgical change: There is postsurgical change consistent with posterior
 lateral interbody fusion of L2-S1. There is solid osseous incorporation of interbody bone graft

posterolaterally and within the interbody spaces of L4-S1. No appreciable bridging bone is seen trave
rsing the fusion sites at L2-3 or L3-4. There are fractured pedicle screws at S1. There are

bilateral pedicle screws at L2-L4 with interconnecting radiolucent cables. There is visualization of 
SI joint arthrodesis screws on the left. There is advanced osteoarthrosis of the right SI joint.



At L5-S1, there is mild right and moderate left osseous neural foraminal narrowing due to surrounding
 bridging bone near the intervertebral disc spaces and facet complexes..



At L4-5, there is no appreciable central canal or neuroforaminal narrowing.



At L3-4, there is vacuum disc phenomenon within the L3-L4 intervertebral disc space. There is advance
d facet joint degenerative change. There is residual broad-based bulge inducing moderate right

neural foraminal narrowing. This appears similar to the comparison MR.



At L2-3, there is no appreciable central canal or neuroforaminal narrowing.



At L1-L2, there is no appreciable central canal or neuroforaminal narrowing.



At T12-L1, there is no appreciable central canal or neuroforaminal narrowing.



IMPRESSION:

1.  Postoperative lumbar spine as above.

2. Predominantly stable multilevel spondylosis of the lumbar spine with neural foraminal narrowing at
 L5-S1 and L3-4.

3. Bilateral nephrolithiasis and colonic diverticulosis



Reported By: Jorden Mccall 

Electronically Signed:  11/1/2019 10:45 AM

## 2019-11-01 NOTE — RAD
LUMBAR MYELOGRAM WITH FLUOROSCOPIC GUIDANCE:



HISTORY: 

Lumbar radiculopathy. Low back pain.



EXPOSURE:

One minute. 1031.4 mcg/sq m.



FINDINGS:



TWO VIEWS LUMBAR SPINE: 

Five lumbar type vertebrae. There are bilateral transpedicular screws at L2, L3, L4 and S1. Additiona
lly there is fusion hardware in the disc prosthesis at L4-L5 and L5-S1. Hypertrophic bone graft

material in the posterior elements from L4 through S1.



Successful lumbar puncture for intrathecal contrast administration. Total of 9 cc of Isovue-M 200 con
trast was administered intrathecally at the L2-L3 disc space.



TECHNIQUE: 

Consent obtained to perform a lumbar puncture for intrathecal contrast administration. The patient's 
back was evaluated. The L2-L3 level was deemed appropriate. The skin was prepped and draped in

sterile fashion. 1% lidocaine, buffered with sodium bicarbonate was used for local anesthesia. Under 
fluoroscopic guidance, a 22-gauge spinal needle was advanced into the CSF space. There is prompt

flow of CSF to the hub of the needle. Via a short tubing catheter, a total of 9 cc of Isovue-M 200 co
ntrast was administered intrathecally. The patient tolerated the procedure well. No immediate or

postprocedural complications.



IMPRESSION: 

Successful lumbar puncture for lumbar myelogram.



Transcribed Date/Time: 11/1/2019 11:45 AM



Reported By: Merry Salomon 

Electronically Signed:  11/1/2019 12:02 PM

## 2019-11-21 NOTE — MMO
Bilateral MAMMO Bilat Screen [facility].

 

CLINICAL HISTORY:

Patient is 69 years old and is seen for screening. The patient has no family

history of breast cancer.  The patient has no personal history of cancer. The

patient has a history of bilateral Breast reduction in 1994.

 

VIEWS:

The views performed were:  bilateral craniocaudal with tomosynthesis and

bilateral mediolateral oblique with tomosynthesis.

 

FILMS COMPARED:

The present examination has been compared to prior imaging studies performed at

[facility] on 10/20/2014, 10/18/2016, 10/25/2017 and 11/14/2018.

 

This study has been interpreted with the assistance of computer-aided detection.

 

MAMMOGRAM FINDINGS:

There are scattered fibroglandular densities.

 

There are stable benign appearing calcifications seen in both breasts.

 

There are no suspicious masses, suspicious calcifications, or new areas of

architectural distortion.

 

IMPRESSION:

THERE IS NO MAMMOGRAPHIC EVIDENCE OF MALIGNANCY.

 

A ROUTINE FOLLOW-UP MAMMOGRAM IN 1 YEAR IS RECOMMENDED.

 

THE RESULTS OF THIS EXAM WERE SENT TO THE PATIENT.

 

ACR BI-RADS Category 2 - Benign finding

 

MAMMOGRAPHY NOTE:

 1. A negative mammogram report should not delay a biopsy if a dominant of

 clinically suspicious mass is present.

 2. Approximately 10% to 15% of breast cancers are not detected by

 mammography.

 3. Adenosis and dense breasts may obscure an underlying neoplasm.

 

 

Reported by: SUSHIL SALINAS MD

Electonically Signed: [id number]

## 2020-03-03 NOTE — MRI
THORACIC SPINE MRI WITHOUT CONTRAST:

3/3/20

 

COMPARISON: 

None. 

 

HISTORY: 

Post laminectomy syndrome, stenosis.

 

TECHNIQUE:  

Multiplanar and multisequence MR imaging of the thoracic spine without contrast.

 

FINDINGS: 

The sagittal STIR imaging demonstrates no focal area of osseous marrow edema. 

 

There is a moderate degree of thoracic spine kyphosis centered within the upper and mid thoracic spin
e secondary to mild anterior wedging of numerous contiguous thoracic vertebral bodies, including the 
T5, T6, T7 and T8 vertebral bodies. There is no evidence for an acute fracture within the thoracic sp
ine. There is no anterolisthesis or retrolisthesis noted within the thoracic spine. There is no abnor
mal signal intensity noted within the thoracic cord. There is no significant central canal stenosis w
ithin the thoracic spine at any level. 

 

There is no significant neural foraminal stenosis on either side at any level within the thoracic spi
ne.

 

Multilevel disc space narrowing and vacuum disc formation is noted throughout the thoracic spine. 

 

IMPRESSION: 

Chronic findings as detailed above. No acute findings are seen within the thoracic spine. No evidence
 for acute fracture. 

 

POS: [facility]

## 2020-08-27 NOTE — ULT
Exam: Ultrasound guided fine-needle aspiration of right thyroid lobe mass



HISTORY: Right lower lobe mass



COMPARISON: 8/24/2020



FINDINGS:



Successful fine-needle aspiration of a solid mass in the right lower lobe. Mass measures 2.1 x 2.8 x 
2.2 cm. Total of 4, 25-gauge fine-needle aspirations were performed.

No immediate or post procedure complication





TECHNIQUE: Consent obtained to perform a fine-needle aspiration of a solid mass in the right thyroid 
lobe. Right neck was prepped and draped in a sterile fashion. 1% lidocaine, buffered with sodium

bicarbonate was used for local anesthesia. Under ultrasound guidance, 4, 25-gauge fine-needle aspirat
ions were performed. No immediate or postprocedural complications



IMPRESSION: Successful fine-needle aspiration. Final pathologic diagnosis is pending.



Reported By: Merry Salomon 

Electronically Signed:  8/27/2020 11:19 AM

## 2020-08-27 NOTE — CT
Exam: CT guided biopsy of a right hemipelvic mass.



HISTORY: Right hemipelvic mass. Weight loss.



COMPARISON: Chest abdomen and pelvic CT 8/19/2020



FINDINGS: Successful CT-guided biopsy. Total of 3 18-gauge Franseen fine-needle aspirations were perf
ormed. Lesional tissue is present. No immediate or postprocedure complications



TECHNIQUE: Consent obtained reformatory CT-guided biopsy of a right hemipelvic mass. Skin was prepped
 and draped in a sterile fashion. 1% lidocaine, buffered with sodium bicarbonate was used for local

anesthesia. Under CT guidance, a 17-gauge metallic trocar was advanced into the lesion. Through this 
trocar, 3 18-gauge Franseen fine-needle aspirations were performed. Lesional tissue is present. No

immediate or postprocedure complications



IMPRESSION:

1. Successful CT-guided biopsy of a right hemipelvic mass.

2. Lesional tissue is present. Final pathologic diagnosis us pending.



Reported By: Merry Salomon 

Electronically Signed:  8/27/2020 10:56 AM

## 2020-08-28 NOTE — CT
Exam: CT guided biopsy of a right hemipelvic mass.



HISTORY: Right hemipelvic mass. Weight loss.



COMPARISON: Chest abdomen and pelvic CT 8/19/2020



FINDINGS: Successful CT-guided biopsy. Total of 3 18-gauge Franseen fine-needle aspirations were perf
ormed. Lesional tissue is present. No immediate or postprocedure complications



TECHNIQUE: Consent obtained reformatory CT-guided biopsy of a right hemipelvic mass. Skin was prepped
 and draped in a sterile fashion. 1% lidocaine, buffered with sodium bicarbonate was used for local

anesthesia. Under CT guidance, a 17-gauge metallic trocar was advanced into the lesion. Through this 
trocar, 3 18-gauge Franseen fine-needle aspirations were performed. Lesional tissue is present. No

immediate or postprocedure complications



IMPRESSION:

1. Successful CT-guided biopsy of a right hemipelvic mass.

2. Lesional tissue is present. Final pathologic diagnosis us pending.



Transcribed Date/Time: 8/28/2020 9:52 AM



Reported By: Merry Salomon 

Electronically Signed:  8/28/2020 10:30 AM

## 2020-09-28 NOTE — RAD
EXAM: Single view of the chest



HISTORY:   Preoperative testing



COMPARISON: None



FINDINGS: Single view of the chest shows a normal sized cardiomediastinal silhouette. There is no xander
dence of consolidation, mass, or pleural effusion. Postsurgical changes are seen in the lumbar

spine. Degenerative changes are seen in the spine.



IMPRESSION: No evidence of acute cardiopulmonary disease



Reported By: Daniele Mata 

Electronically Signed:  9/28/2020 2:36 PM

## 2020-09-29 NOTE — EKG
Test Reason : PREOP

Blood Pressure : ***/*** mmHG

Vent. Rate : 092 BPM     Atrial Rate : 092 BPM

   P-R Int : 148 ms          QRS Dur : 080 ms

    QT Int : 372 ms       P-R-T Axes : 049 030 046 degrees

   QTc Int : 460 ms

 

Sinus rhythm with Premature supraventricular complexes

Low voltage QRS

Cannot rule out Anterior infarct , age undetermined

Abnormal ECG

No previous ECGs available

Confirmed by SHAGUFTA DUFFY ([id number]) on 9/29/2020 4:20:11 PM

 

Referred By: NAGA BRIGGS           Confirmed By:SHAGUFTA DUFFY

## 2020-10-01 NOTE — RAD
PORTABLE CHEST:

 

Date:  10/01/2020

 

HISTORY:  

Follow-up MediPort placement. 

 

COMPARISON:  

09/28/2020. 

 

FINDINGS:

MediPort type catheter has been placed on the right. The line overlies the SVC. No pneumothorax or in
filtrate seen. Heart and mediastinum unremarkable and stable. 

 

IMPRESSION: 

No acute findings.  

 

 

POS: [facility]

## 2020-10-02 NOTE — OP
DATE OF PROCEDURE:  10/01/2020



PREOPERATIVE DIAGNOSIS:  Pelvic malignancy.



POSTOPERATIVE DIAGNOSIS:  Pelvic malignancy.



PROCEDURE PERFORMED:  Placement of a standard-size power compatible right subclavian

MediPort. 



ANESTHESIA:  Total intravenous anesthesia with local using a mixture of 0.25%

Marcaine with epinephrine and 1% lidocaine. 



INDICATIONS:  The patient is a 70-year-old white female.  She is recently diagnosed

with a pelvic malignancy for which chemotherapy has been recommended.  MediPort

placement is requested for chemotherapy administration. 



DESCRIPTION OF OPERATION:  Informed consent was obtained.  The patient was taken to

the operating room where total intravenous anesthesia was obtained with the patient

in supine position.  Right periclavicular area was prepped with ChloraPrep and

draped in sterile fashion.  Local anesthetic was infiltrated and a large-gauge

needle was passed under the clavicle in the subclavian vein.  Guidewire was passed

through the needle and fluoroscopically confirmed to enter the superior vena cava.

Additional local anesthetic was infiltrated and transverse incision was created

based on needle insertion site.  A subcutaneous pocket was dissected inferiorly.

Introducer dilator was passed over the guidewire under fluoroscopic guidance.  The

guidewire and dilator were removed, and the catheter was passed through the

introducer.  The tip of the catheter was positioned at the atriocaval junction and

the catheter was trimmed to the appropriate length and secured to the locking hub of

the MediPort.  The port was then placed in the subcutaneous pocket where it was

secured to the pectoral fascia with 2 interrupted sutures of 3-0 Prolene.  The

incision was then closed in layers with 3-0 and 4-0 Monocryl.  Additional local

anesthetic was infiltrated.  The port was cannulated with a Thompson needle and it

aspirated blood freely and was flushed with heparinized saline.  Dermabond was

placed externally on the skin incision.  There were no complications.  Blood loss

was negligible.  The patient tolerated the procedure well and was taken to recovery

room in stable condition. 



FINDINGS:  I chose a standard-size port secondary to the patient's body habitus.

This was placed uneventfully into the right subclavian vein.  Fluoroscopy was used

during the procedure.  There were no complications and blood loss was essentially

none.  She tolerated the procedure well and was taken to recovery room in stable

condition. 







Job ID:  [id number]

## 2020-10-12 NOTE — HP
CHIEF COMPLAINT:  Abdominal pain.



HISTORY OF PRESENT ILLNESS:  This patient is a 70-year-old female who was

experiencing some abdominopelvic pain and nausea.  She lost about 57 pounds over 3

months.  She was seen by GI, where her initial exam revealed a mass in the pelvic

area.  She had a CT scan revealing a mass.  She subsequently underwent a biopsy,

which confirmed a high-grade sarcoma.  The patient was subsequently established at

[facility].  However, she follows with Dr. Welch locally and had Adriamycin

started a week ago.  She subsequently did fine until 2 days ago when she started

experiencing pain again in her pelvic area.  She thought it was simply related to

the cancer.  Therefore, she did not immediately seek treatment, although she

reported temperature up to 103.  She waited until today when the cancer clinic was

open and she presented there.  She indicates that she did have a urinalysis there

that was "red as a fire truck."  Labs there revealed a white count of 1.4,

hemoglobin 6.9, platelets 212.  ANC was 0.2.  I felt that she likely had a

recurrence of urinary tract infection, which she had about 3 weeks ago and was

treated by her primary care provider with 10 days of antibiotics.  She denies any

urinary symptoms including dysuria or frequency at this time.  She denies any flank

pain.  Of note, the patient does have a history of ureterolithiasis as well.  The

Cancer Clinic notes indicate the patient did receive a dose of Udenyca last cycle so

the anticipation is that her white count would improve relatively soon.  The patient

received a dose of vancomycin and cefepime at the cancer clinic and was referred to

this facility for further treatment for neutropenic fever.  Urinalysis and urine

cultures were obtained there. 



REVIEW OF SYSTEMS:  The patient reports that she is continuing to have the pain.

She has also had some history of constipation, but has been taking some stool

softeners and that has improved significantly.  Otherwise, her appetite has come

back and she is capable now of eating about half of what she would consider a full

meal, which is much better than what she was doing previously.  All other systems

reviewed.  All pertinent positives and negatives noted in the history of present

illness. 



PAST MEDICAL HISTORY:  The above-mentioned sarcoma, ureterolithiasis, diverticulosis

without diverticulitis, hypertension, depression, hyperlipidemia. 



SURGICAL HISTORY:  Back surgery x6, cholecystectomy, hysterectomy.



FAMILY HISTORY:  Mother  of an MI.  Father is alive at 92 and has hypertension.



SOCIAL HISTORY:  The patient is a former smoker, quit two months ago.  She denies

alcohol or drugs.  She is .  She is full code, and her  would be her

surrogate decision maker should that be necessary. 



CURRENT MEDICATIONS:  

1. Compazine 10 mg q.6h p.r.n.

2. Duloxetine 60 mg p.o. daily.

3. Folic acid 1 mg daily.

4. Gabapentin 300 mg p.o. daily.

5. Hydrocodone/acetaminophen p.r.n.

6. Lovastatin 20 mg daily.

7. Zofran ODT p.r.n.

8. Oxycodone 5 mg p.r.n.



ALLERGIES:  NONE.



PHYSICAL EXAMINATION:

VITAL SIGNS:  Temperature is 97.9, pulse 96, respirations 18, O2 saturation 96% on

room air, /61. 

GENERAL:  She is awake, alert, oriented, pleasant, cooperative, a bit

hearing-impaired. 

HEENT:  TODD. No OP lesions. 

NECK:  Supple and symmetric. 

HEART:  Regular rate and rhythm without murmurs, gallops, or rubs. 

LUNGS:  Clear to auscultation bilaterally with good chest wall expansion, air

exchange. 

ABDOMEN:  Soft, nondistended with positive bowel sounds.  She does have a palpable

mass in the right lower abdomen and pelvis, which is somewhat tender to palpation. 

EXTREMITIES:  No cyanosis, clubbing, or edema.  There is trace to 1+ bilateral lower

extremity edema which the patient indicates is new to her. 

PSYCH:  Normal affect and behavior. 

NEURO:  No focal deficits.  She has normal cognition, normal cranial nerves and

spontaneous moves all extremities. 



LABORATORY DATA:  As noted above.



IMPRESSION AND PLAN:  

1. Neutropenic fever in patient with aggressive sarcoma and ANC of 0.2.  The patient

has received vancomycin and cefepime.  She will continue on those while we await

blood and urine cultures.  We will obtain a chest x-ray. 

2. Sarcoma.  Oncology following.  The patient has received the 1st course of

Adriamycin.  We will cover her with some oral and intravenous pain medications and

continue with the gabapentin as well. 

3. History of depression.  Continue duloxetine.

4. History of hyperlipidemia.  Continue with the lovastatin.







Job ID:  [id number]

## 2020-10-12 NOTE — RAD
PORTABLE CHEST: 

 

History: Shortness of breath

 

 

Comparison: 10-1-2020

 

FINDINGS: 

Heart size appears slightly enlarged. Right sided Mediport catheter is present. Lungs are clear of in
filtrates. No signs of failure. 

 

IMPRESSION: 

Borderline to minimal cardiomegaly. 

 

POS: OFF

## 2020-10-13 NOTE — PDOC.HOSPP
- Subjective


Encounter Date: 10/13/20


Subjective: 


Says she feels fine other than the fact that she is having the pain associated 

with her lower abdomen.  Otherwise, she actually says she feels great.





- Objective


Vital Signs & Weight: 


                             Vital Signs (12 hours)











  Temp Pulse Resp BP Pulse Ox


 


 10/13/20 11:44  99.9 F H  106 H  18  131/63  95


 


 10/13/20 07:54  99.0 F  101 H  18  119/60  97








                                     Weight











Admit Weight                   214 lb


 


Weight                         214 lb














I&O: 


                                        











 10/12/20 10/13/20 10/14/20





 06:59 06:59 06:59


 


Intake Total  2890 


 


Balance  2890 











Result Diagrams: 


                                 10/13/20 06:00





                                 10/13/20 06:00





Hospitalist ROS





- Medication


Medications: 


Active Medications











Generic Name Dose Route Start Last Admin





  Trade Name Freq  PRN Reason Stop Dose Admin


 


Hydrocodone Bitart/Acetaminophen  2 tab  10/13/20 12:24  10/13/20 13:31





  Hydrocodone/Acetaminophen 10/325 Mg Tablet  PO   2 tab





  Q4H PRN   Administration





  Moderate to Severe Pain (6-10)  


 


Atorvastatin Calcium  10 mg  10/12/20 21:00  10/12/20 20:20





  Atorvastatin Calcium 10 Mg Tab  PO   10 mg





  HS EILEEN   Administration


 


Duloxetine HCl  60 mg  10/13/20 09:00  10/13/20 09:19





  Duloxetine 60 Mg Cap  PO   60 mg





  DAILY EILEEN   Administration


 


Famotidine  20 mg  10/12/20 21:00  10/13/20 09:19





  Famotidine 20 Mg Tab  PO   20 mg





  BID EILEEN   Administration


 


Gabapentin  300 mg  10/12/20 21:00  10/12/20 20:17





  Gabapentin 300 Mg Cap  PO   300 mg





  HS EILEEN   Administration


 


Cefepime HCl 2 gm/ Sodium  100 mls @ 100 mls/hr  10/12/20 18:00  10/13/20 09:19





  Chloride  IVPB   100 mls





  0200,1000,1800 EILEEN   Administration


 


Sodium Chloride  1,000 mls @ 50 mls/hr  10/12/20 18:15  10/13/20 02:47





  Normal Saline 0.9%  IV   1,000 mls





  .Q20H EILEEN   Administration


 


Vancomycin HCl 1.5 gm/ Device  300 mls @ 200 mls/hr  10/13/20 04:00  10/13/20 

03:30





  IVPB   300 mls





  0400 EILEEN   Administration














- Exam


General Appearance: NAD, awake alert


Heart: RRR, no murmur, no gallops, no rubs, normal peripheral pulses


Respiratory: CTAB, no wheezes, no rales, no ronchi, normal chest expansion, no 

tachypnea, normal percussion


Gastrointestinal: soft, non-distended, normal bowel sounds, tender to palpation


Gastrointestinal - other findings: Palpable mass in the right lower abdomen that

is tender to palpation


Extremities: no cyanosis, no clubbing


Extremities - other findings: Trace edema


Skin: normal turgor


Neurological: cranial nerve grossly intact, normal sensation to touch, no 

weakness, no focal deficits, no new deficit


Musculoskeletal: normal tone, normal strength, no muscle wasting


Psychiatric: normal affect, normal behavior, A&O x 3





Hosp A/P


(1) Neutropenic fever


Code(s): D70.9 - NEUTROPENIA, UNSPECIFIED; R50.81 - FEVER PRESENTING WITH 

CONDITIONS CLASSIFIED ELSEWHERE   Status: Acute   





(2) Primary sarcoma of intra-abdominal site


Code(s): C49.4 - MALIGNANT NEOPLASM OF CONNECTIVE AND SOFT TISSUE OF ABDOMEN   

Status: Acute   





(3) Hyperlipidemia


Code(s): E78.5 - HYPERLIPIDEMIA, UNSPECIFIED   Status: Acute   





- Plan





Neutropenic fever:


Patient's ANC is still quite low today.  Did not move in an upward direction 

significantly.  We will continue with broad-spectrum antibiotic coverage with 

vancomycin and cefepime.  Follow-up cultures.  UA and urine culture were 

apparently done at the oncology clinic.  We will try to follow-up on those.  Co

shanel for possible urinary tract infection given that she had apparently had 

some discoloration of the urine at that time and has recently had a urinary 

tract infection.





Sarcoma of the abdomen:


Patient has some pain associated with this.  She is status post her first course

of Adriamycin.  Will increase her Norco and her morphine dosing.  Oncology 

consult.





Hyperlipidemia:


Continue statin.

## 2020-10-14 NOTE — PDOC.HOSPP
- Subjective


Encounter Date: 10/14/20


Encounter Time: 09:55


Subjective: 


f/u for neutropenic fever with negative Ucx/blood cx to date on 

Cefepime/Vancomycin. States feeling ok except no BM x 3 days. Took Miralax and 

Senokot but no BM. 





- Objective


Vital Signs & Weight: 


                             Vital Signs (12 hours)











  Temp Pulse Resp BP Pulse Ox


 


 10/14/20 08:00  97.8 F  108 H  16  108/62  97








                                     Weight











Admit Weight                   214 lb


 


Weight                         214 lb














I&O: 


                                        











 10/13/20 10/14/20 10/15/20





 06:59 06:59 06:59


 


Intake Total 2890 1625 


 


Balance 2890 1625 











Result Diagrams: 


                                 10/14/20 05:24





                                 10/13/20 06:00


Additional Labs: 


Microbiology





10/12/20 18:20   Venous blood - Left Hand   Blood Culture - Preliminary


                              Specimen has been received and culture in 

progress.


                              No Growth to date.


10/12/20 18:04   Venous blood - Right Arm   Blood Culture - Preliminary


                              Specimen has been received and culture in progres

s.


                              No Growth to date.


10/12/20 16:16   Urine clean catch   Urine Culture - Preliminary


                              NO GROWTH AT 24 HOURS








Radiology Reviewed by me: Yes (PCXR - no acute infiltrate)





Hospitalist ROS





- Medication


Medications: 


Active Medications











Generic Name Dose Route Start Last Admin





  Trade Name Freq  PRN Reason Stop Dose Admin


 


Hydrocodone Bitart/Acetaminophen  1 tab  10/13/20 12:24  10/14/20 09:04





  Hydrocodone/Acetaminophen 10/325 Mg Tablet  PO   1 tab





  Q4H PRN   Administration





  Mild-Moderate Pain (1-5)  


 


Hydrocodone Bitart/Acetaminophen  2 tab  10/13/20 12:24  10/14/20 04:03





  Hydrocodone/Acetaminophen 10/325 Mg Tablet  PO   2 tab





  Q4H PRN   Administration





  Moderate to Severe Pain (6-10)  


 


Atorvastatin Calcium  10 mg  10/12/20 21:00  10/13/20 20:11





  Atorvastatin Calcium 10 Mg Tab  PO   10 mg





  HS EILEEN   Administration


 


Duloxetine HCl  60 mg  10/13/20 09:00  10/14/20 09:06





  Duloxetine 60 Mg Cap  PO   60 mg





  DAILY EILEEN   Administration


 


Famotidine  20 mg  10/12/20 21:00  10/14/20 09:06





  Famotidine 20 Mg Tab  PO   20 mg





  BID EILEEN   Administration


 


Gabapentin  300 mg  10/12/20 21:00  10/13/20 20:11





  Gabapentin 300 Mg Cap  PO   300 mg





  HS EILEEN   Administration


 


Magnesium Hydroxide  30 ml  10/13/20 18:26  10/13/20 20:11





  Milk Of Magnesia 30 Ml Udcup  PO   30 ml





  DAILYPRN PRN   Administration





  Constipation  


 


Morphine Sulfate  4 mg  10/13/20 12:25  10/13/20 20:14





  Morphine 2 Mg/Ml Vial  SLOW IVP   4 mg





  Q4H PRN   Administration





  BREAKTHRU PAIN  (6-10)/NPO  


 


Polyethylene Glycol  17 gm  10/13/20 18:26  10/14/20 09:02





  Polyethylene Glycol 3350 17 Gm Packet  PO   17 gm





  DAILYPRN PRN   Administration





  Constipation  














- Exam


General Appearance: NAD, awake alert


Eye: PERRL, anicteric sclera


ENT: normocephalic atraumatic, no oropharyngeal lesions


Neck: supple, symmetric, no JVD, no thyromegaly, no lymphadenopathy


Heart: RRR, no gallops, no rubs, normal peripheral pulses


Heart - other findings: S1, S2


Respiratory: CTAB, no wheezes, no rales, no ronchi, normal chest expansion


Gastrointestinal: soft, non-distended, normal bowel sounds, no guarding, no 

rigidity


Gastrointestinal - other findings: mild TTP in RLQ


Extremities: no cyanosis, 1+ LE edema


Skin: normal turgor, no lesions


Neurological: cranial nerve grossly intact, no new deficit


Musculoskeletal: normal tone, normal strength, no muscle wasting


Psychiatric: normal affect, A&O x 3





Hosp A/P


(1) Neutropenic fever


Code(s): D70.9 - NEUTROPENIA, UNSPECIFIED; R50.81 - FEVER PRESENTING WITH 

CONDITIONS CLASSIFIED ELSEWHERE   Status: Acute   


Plan: 


Initial Blood/Ucx negative to date, d/c Vancomycin/Cefepime, start Omnicef








(2) Primary sarcoma of intra-abdominal site


Code(s): C49.4 - MALIGNANT NEOPLASM OF CONNECTIVE AND SOFT TISSUE OF ABDOMEN   

Status: Acute   


Plan: 


Continue outpt chemotherapy after d/c, pain control with Norco








(3) Constipation


Code(s): K59.00 - CONSTIPATION, UNSPECIFIED   Status: Acute   


Qualifiers: 


   Constipation type: slow transit constipation   Qualified Code(s): K59.01 - 

Slow transit constipation   


Plan: 


Dulcolax supp, Senokot 2 tabs now, Mag citrate 








(4) Pancytopenia


Code(s): D61.818 - OTHER PANCYTOPENIA   Status: Chronic   


Plan: 


Secondary to chemotherapy, serial monitoring, s/p 2u PRBC's








- Plan


continue antibiotics, , out of bed/ambulate, DVT proph w/SCDs





Stable currently


Saline lock IVF's


Mag citrate/Senokot/Dulcolax supp


OOB/ambulate


D/C Vancomycin/Cefepime


Start Omnicef 300mg BID


AM lab: CBC


Likely home in am

## 2020-10-14 NOTE — PDOC.MOPN
Interval History: 





feels "75% better". Still has occasional abdominal pain. BM today.





- Vital Signs


Vital Signs: 


                             Vital Signs (12 hours)











  Temp Pulse Resp BP Pulse Ox


 


 10/14/20 12:00  98.1 F  106 H  16  112/64  98


 


 10/14/20 08:00  97.8 F  108 H  16  108/62  97








                                     Weight











Admit Weight                   214 lb


 


Weight                         214 lb

















- Physical Exam


General: Alert, Oriented x3, No acute distress


HEENT: Atraumatic, PERRLA, EOMI, Mucous membr. moist/pink


Lungs: Clear to auscultation, Normal air movement


Cardiovascular: Regular rate, Normal S1, Normal S2, No murmurs, Gallops, Rubs


Abdomen: Normal bowel sounds, Soft, No tenderness, No hepatospenomegaly, No 

masses


Extremities: No clubbing, No cyanosis (2+ BLE edema), Normal pulses


Skin: No rashes, No breakdown, No significant lesion


Neurological: Normal gait, Normal speech, Strength at 5/5 X4 ext, Normal tone, 

Sensation intact, Cranial nerves 3-12 NL, Reflexes 2+


Psych/Mental Status: Mental status NL, Mood NL





- Labs


Result Diagrams: 


                                 10/14/20 05:24





                                 10/13/20 06:00


Lab results: 


                         Laboratory Results - last 24 hr





10/14/20 05:24: WBC 1.8 L, RBC 3.22 L, Hgb 8.3 L, Hct 27.7 L, MCV 86.0, MCH 25.6

L, MCHC 29.8 L, RDW 16.3 H, Plt Count 133, MPV 8.8, Neutrophils % (Manual) 17 L,

Band Neuts % (Manual) 12 H, Lymphocytes % (Manual) 61 H, Monocytes % (Manual) 7,

Eosinophils % (Manual) 3, Lymphocytes # Not Reportable, RBC Morph Comment Normal








Status: lab reviewed by me





A/P





- Problem


(1) Neutropenic fever


Current Visit: Yes   Code(s): D70.9 - NEUTROPENIA, UNSPECIFIED; R50.81 - FEVER 

PRESENTING WITH CONDITIONS CLASSIFIED ELSEWHERE   Status: Acute   





(2) Primary sarcoma of intra-abdominal site


Current Visit: Yes   Code(s): C49.4 - MALIGNANT NEOPLASM OF CONNECTIVE AND SOFT 

TISSUE OF ABDOMEN   Status: Acute   





- Plan


Plan: 





1. WBC trending up


2.  pain controlled


3. c/o constipation, laxative tonigh


4. switched to oral omnicef


5. home in am is abdominal pain improved.

## 2020-10-15 NOTE — DIS
DATE OF ADMISSION:  10/12/2020



DATE OF DISCHARGE:  10/15/2020



DISCHARGE DIAGNOSES:  

1. Neutropenic fever, resolved.

2. Primary sarcoma of the abdomen.

3. Constipation, resolved.

4. Pancytopenia.



CONSULTATIONS:  Dr. Welch with Medical Oncology Service.



PERTINENT LABORATORY AND X-RAY FINDINGS:  Alkaline phosphatase ranged between 130 to

136, albumin ranged between 2.2 to 2.7.  CBC showed a white blood cell count ranged

between 1.3 to 2.7, hemoglobin ranged between 8.0 to 8.3.  COVID-19 PCR not

detected, 10/12/2020.  Blood cultures x2 dated 10/12/2020, showed no growth at 48

hours.  Portable chest x-ray dated 10/12/2020, showed no acute cardiopulmonary

process. 



HOSPITAL COURSE:  The patient initially presented with persistent abdominal pain in

the context of known sarcoma of the abdomen, currently undergoing chemotherapy at [facility] in [place], [place].  The patient was noted with febrile episode in

conjunction with neutropenia and admitted for neutropenic fever.  The patient was

placed on broad-spectrum IV antibiotic therapy including vancomycin and cefepime and

given general neutropenic precautions.  Blood cultures were unrevealing and the

patient clinically stabilized with supportive management.  The patient did receive a

bowel regimen due to constipation with successful bowel movement prior to discharge.

 Currently, the patient at baseline clinical status, tolerating regular oral intake

with stable vital signs.  I have examined the patient at the time of discharge and

discussed followup instructions.  The patient verbalized understanding and

agreement, ready for discharge on 10/15/2020. 



DISCHARGE MEDICATIONS:  

1. Duloxetine 60 mg p.o. daily.

2. Folic acid 1 mg p.o. daily.

3. Gabapentin 300 mg p.o. at bedtime.

4. Lovastatin 20 mg p.o. at bedtime.

5. Mirtazapine 15 mg p.o. at bedtime.

6. Ventolin HFA 2 puffs inhaled q.6 hours p.r.n.

7. Zofran 4 mg p.o. q.4 hours p.r.n.

8. Norco 10/325 mg 1 to 2 tablets p.o. q.4 hours p.r.n. pain.

9. Omnicef 300 mg p.o. b.i.d. x4 days.



FOLLOWUP:  The patient may follow up with Dr. Emre Ledesma, primary care provider.

The patient will follow up with Dr. Soumya Welch, with Medical Oncology Service. 



CONDITION ON DISCHARGE:  Stable.



ACTIVITY:  Ad-ramana.



DIET:  Regular.



CODE STATUS:  Full.



DISPOSITION:  To home, 10/15/2020.



TIME SPENT:  Total time preparing and coordinating discharge is 32 minutes.







Job ID:  [id number]

## 2020-10-15 NOTE — PDOC.MOPN
Interval History: 





Patient has intermittent abdominal cramping. BM last night. No fevers.





- Vital Signs


Vital Signs: 


                             Vital Signs (12 hours)











  Temp Pulse Resp BP Pulse Ox


 


 10/15/20 06:56  98.5 F  101 H  20  119/57 L  94 L








                                     Weight











Admit Weight                   214 lb


 


Weight                         214 lb

















- Physical Exam


General: Alert, Oriented x3, No acute distress


HEENT: Atraumatic, PERRLA, EOMI, Mucous membr. moist/pink


Lungs: Clear to auscultation, Normal air movement


Cardiovascular: Regular rate, Normal S1, Normal S2, No murmurs, Gallops, Rubs


Abdomen: Other


Neurological: Normal gait, Normal speech, Strength at 5/5 X4 ext, Normal tone, 

Sensation intact, Cranial nerves 3-12 NL, Reflexes 2+





- Labs


Result Diagrams: 


                                 10/15/20 06:00





                                 10/13/20 06:00


Lab results: 


                         Laboratory Results - last 24 hr





10/15/20 06:00: WBC 2.7 L, RBC 3.17 L, Hgb 8.1 L, Hct 27.0 L, MCV 85.2, MCH 25.5

L, MCHC 29.9 L, RDW 16.1 H, Plt Count 146, MPV 8.5, Neutrophils % (Manual) 11 L,

Band Neuts % (Manual) 13 H, Lymphocytes % (Manual) 65 H, Monocytes % (Manual) 6,

Eosinophils % (Manual) 5, Lymphocytes # Not Reportable








Status: lab reviewed by me





A/P





- Problem


(1) Neutropenic fever


Current Visit: Yes   Code(s): D70.9 - NEUTROPENIA, UNSPECIFIED; R50.81 - FEVER 

PRESENTING WITH CONDITIONS CLASSIFIED ELSEWHERE   Status: Acute   





(2) Primary sarcoma of intra-abdominal site


Current Visit: Yes   Code(s): C49.4 - MALIGNANT NEOPLASM OF CONNECTIVE AND SOFT 

TISSUE OF ABDOMEN   Status: Acute   





- Plan


Plan: 





1. ok to dc home


2. Fortine rx sent to pharmacy


3. follow-up next week.

## 2020-12-01 NOTE — CT
CT ANGIOGRAM THORAX WITH IV CONTRAST AND 3-D RECONSTRUCTIONS



CLINICAL INDICATION:

Shortness of breath. Low oxygen saturations during chemotherapy treatment today. 



COMPARISON:

8/19/2020



FINDINGS:

Pulmonary arteries: No filling defects are seen in the pulmonary arteries to suggest a pulmonary embo
esther.





Aorta: Thoracic aorta is normal in caliber without evidence of an aortic dissection. Minimal scattere
d vascular ossification are seen in the aortic arch as well as involving the proximal abdominal

aorta.



Lungs: Scattered linear densities are seen throughout the lungs likely attributable to atelectasis an
d/or possibly areas of mild scarring. No consolidation or pleural fluid is seen. A tiny subpleural

2 mm nodule is seen in the superior segment right lower lobe posteriorly. This is a stable finding co
mpared to prior study. No additional pulmonary nodule is seen, and there is no evidence of a mass.



Mediastinum: No enlarged lymph nodes are seen by CT size criteria. A right subclavian Mediport cathet
er remains in place with tip in proximal SVC.



Thyroid gland: Normal in appearance where visualized.



Osseous structures: Degenerative changes are seen in the spine. No suspicious lytic or sclerotic osse
ous lesions are identified.



Chest wall: No abnormality visualized.



Upper abdomen: Nonobstructing superior pole left renal calculus is again seen. Postcholecystectomy ch
anges are noted. 



IMPRESSION:

1. No CT evidence of a pulmonary embolus. 

2. Nonspecific tiny 2 mm subpleural right lower lobe pulmonary nodule.

3. Nonobstructing left renal calculus.



Reported By: Tres Miguel 

Electronically Signed:  12/1/2020 2:58 PM